# Patient Record
Sex: MALE | Race: ASIAN | NOT HISPANIC OR LATINO | Employment: OTHER
[De-identification: names, ages, dates, MRNs, and addresses within clinical notes are randomized per-mention and may not be internally consistent; named-entity substitution may affect disease eponyms.]

---

## 2017-07-21 ENCOUNTER — IMAGING SERVICES (OUTPATIENT)
Dept: OTHER | Age: 67
End: 2017-07-21

## 2017-07-21 ENCOUNTER — CHARTING TRANS (OUTPATIENT)
Dept: ORTHOPEDICS | Age: 67
End: 2017-07-21

## 2017-07-21 ENCOUNTER — CHARTING TRANS (OUTPATIENT)
Dept: OTHER | Age: 67
End: 2017-07-21

## 2018-07-26 ENCOUNTER — HOSPITAL (OUTPATIENT)
Dept: OTHER | Age: 68
End: 2018-07-26
Attending: INTERNAL MEDICINE

## 2018-07-26 ENCOUNTER — CHARTING TRANS (OUTPATIENT)
Dept: OTHER | Age: 68
End: 2018-07-26

## 2018-11-28 VITALS — HEIGHT: 69 IN | BODY MASS INDEX: 35.55 KG/M2 | WEIGHT: 240 LBS

## 2019-01-03 ENCOUNTER — HOSPITAL ENCOUNTER (OUTPATIENT)
Dept: ULTRASOUND IMAGING | Facility: HOSPITAL | Age: 69
Discharge: HOME | End: 2019-01-03
Attending: SPECIALIST
Payer: MEDICARE

## 2019-01-03 DIAGNOSIS — R60.0 LOWER EXTREMITY EDEMA: ICD-10-CM

## 2019-01-03 PROCEDURE — 93970 EXTREMITY STUDY: CPT | Performed by: SPECIALIST

## 2019-01-07 ENCOUNTER — HOSPITAL ENCOUNTER (OUTPATIENT)
Dept: CV DIAGNOSTICS | Facility: HOSPITAL | Age: 69
Discharge: HOME | End: 2019-01-07
Attending: SPECIALIST
Payer: MEDICARE

## 2019-01-07 DIAGNOSIS — R06.00 DYSPNEA, UNSPECIFIED: ICD-10-CM

## 2019-01-07 DIAGNOSIS — R55 SYNCOPE: ICD-10-CM

## 2019-01-07 DIAGNOSIS — E78.5 DYSLIPIDEMIA: ICD-10-CM

## 2019-01-07 PROCEDURE — 93018 CV STRESS TEST I&R ONLY: CPT | Performed by: SPECIALIST

## 2019-01-07 PROCEDURE — 93306 TTE W/DOPPLER COMPLETE: CPT | Performed by: SPECIALIST

## 2019-01-07 PROCEDURE — 78452 HT MUSCLE IMAGE SPECT MULT: CPT | Performed by: SPECIALIST

## 2019-01-07 PROCEDURE — 93017 CV STRESS TEST TRACING ONLY: CPT | Performed by: SPECIALIST

## 2019-01-09 ENCOUNTER — HOSPITAL ENCOUNTER (OUTPATIENT)
Dept: ULTRASOUND IMAGING | Facility: HOSPITAL | Age: 69
Discharge: HOME | End: 2019-01-09
Attending: SPECIALIST
Payer: MEDICARE

## 2019-01-09 ENCOUNTER — HOSPITAL ENCOUNTER (OUTPATIENT)
Dept: CV DIAGNOSTICS | Facility: HOSPITAL | Age: 69
Discharge: HOME | End: 2019-01-09
Attending: SPECIALIST
Payer: MEDICARE

## 2019-01-09 DIAGNOSIS — R06.00 DYSPNEA, UNSPECIFIED: ICD-10-CM

## 2019-01-09 DIAGNOSIS — R55 SYNCOPE, UNSPECIFIED SYNCOPE TYPE: ICD-10-CM

## 2019-01-09 DIAGNOSIS — R55 SYNCOPE: ICD-10-CM

## 2019-01-09 PROCEDURE — 93225 XTRNL ECG REC<48 HRS REC: CPT | Performed by: SPECIALIST

## 2019-01-09 PROCEDURE — 93226 XTRNL ECG REC<48 HR SCAN A/R: CPT | Performed by: SPECIALIST

## 2019-01-09 PROCEDURE — 93880 EXTRACRANIAL BILAT STUDY: CPT | Performed by: SPECIALIST

## 2019-01-09 PROCEDURE — 93227 XTRNL ECG REC<48 HR R&I: CPT | Performed by: SPECIALIST

## 2019-05-14 ENCOUNTER — APPOINTMENT (OUTPATIENT)
Dept: CT IMAGING | Facility: HOSPITAL | Age: 69
End: 2019-05-14
Attending: EMERGENCY MEDICINE
Payer: MEDICARE

## 2019-05-14 ENCOUNTER — HOSPITAL ENCOUNTER (EMERGENCY)
Facility: HOSPITAL | Age: 69
Discharge: HOME | End: 2019-05-14
Attending: EMERGENCY MEDICINE
Payer: MEDICARE

## 2019-05-14 VITALS
WEIGHT: 235 LBS | HEIGHT: 68 IN | TEMPERATURE: 98 F | RESPIRATION RATE: 21 BRPM | HEART RATE: 58 BPM | BODY MASS INDEX: 35.61 KG/M2 | DIASTOLIC BLOOD PRESSURE: 80 MMHG | OXYGEN SATURATION: 97 % | SYSTOLIC BLOOD PRESSURE: 127 MMHG

## 2019-05-14 DIAGNOSIS — R55 SYNCOPE, NEAR: Primary | ICD-10-CM

## 2019-05-14 PROCEDURE — 99285 EMERGENCY DEPT VISIT HI MDM: CPT | Performed by: EMERGENCY MEDICINE

## 2019-05-14 PROCEDURE — 80053 COMPREHEN METABOLIC PANEL: CPT | Performed by: EMERGENCY MEDICINE

## 2019-05-14 PROCEDURE — 96361 HYDRATE IV INFUSION ADD-ON: CPT | Performed by: EMERGENCY MEDICINE

## 2019-05-14 PROCEDURE — 96360 HYDRATION IV INFUSION INIT: CPT | Performed by: EMERGENCY MEDICINE

## 2019-05-14 PROCEDURE — 93010 ELECTROCARDIOGRAM REPORT: CPT | Performed by: EMERGENCY MEDICINE

## 2019-05-14 PROCEDURE — 85025 COMPLETE CBC W/AUTO DIFF WBC: CPT | Performed by: EMERGENCY MEDICINE

## 2019-05-14 PROCEDURE — 70450 CT HEAD/BRAIN W/O DYE: CPT | Performed by: EMERGENCY MEDICINE

## 2019-05-14 PROCEDURE — 93005 ELECTROCARDIOGRAM TRACING: CPT

## 2019-05-14 PROCEDURE — 84484 ASSAY OF TROPONIN QUANT: CPT | Performed by: EMERGENCY MEDICINE

## 2019-05-14 RX ORDER — SODIUM CHLORIDE 9 MG/ML
1000 INJECTION, SOLUTION INTRAVENOUS ONCE
Status: COMPLETED | OUTPATIENT
Start: 2019-05-14 | End: 2019-05-14

## 2019-05-14 NOTE — ED INITIAL ASSESSMENT (HPI)
Reports he has had dizziness over the last 3 days. Denies chest pain or shortness of breath. Denies h/a.

## 2019-05-14 NOTE — ED PROVIDER NOTES
Patient Seen in: [facility] Emergency Department    History   Patient presents with:  Dizziness (neurologic)    Stated Complaint: dizziness     HPI    The patient is a 69-year-old male with a history of BPH, hypertension, presenting to the emergency above.    Physical Exam     ED Triage Vitals [05/14/19 1542]   /79   Pulse 83   Resp 18   Temp 98.3 °F (36.8 °C)   Temp src Oral   SpO2 96 %   O2 Device None (Room air)       Current:/80   Pulse 55   Temp 98.3 °F (36.8 °C) (Oral)   Resp 16   Ht WITH DIFFERENTIAL WITH PLATELET.   Procedure                               Abnormality         Status                     ---------                               -----------         ------                     CBC W/ DIFFERENTIAL[[id number]]          Abnormal age-indeterminate     microvascular ischemic changes in the cerebral white matter. Right     tentorial calcification. There is no evident fracture. Small right maxillary retention cyst.      Minimal ethmoid mucosal thickening.   Fluid in the left [phone]    Call in 1 day          Medications Prescribed:  Current Discharge Medication List

## 2019-05-15 NOTE — ED NOTES
Report received from Ramsey Johnston, [facility]. Pt awaiting disposition. Family at bedside.

## 2019-05-18 ENCOUNTER — LAB ENCOUNTER (OUTPATIENT)
Dept: LAB | Facility: HOSPITAL | Age: 69
End: 2019-05-18
Attending: SPECIALIST
Payer: MEDICARE

## 2019-05-18 DIAGNOSIS — R55 SYNCOPE: ICD-10-CM

## 2019-05-18 DIAGNOSIS — R06.00 DYSPNEA, PAROXYSMAL NOCTURNAL: ICD-10-CM

## 2019-05-18 DIAGNOSIS — R42 DIZZINESS: Primary | ICD-10-CM

## 2019-05-18 PROCEDURE — 36415 COLL VENOUS BLD VENIPUNCTURE: CPT

## 2019-05-18 PROCEDURE — 85025 COMPLETE CBC W/AUTO DIFF WBC: CPT

## 2019-05-18 PROCEDURE — 80048 BASIC METABOLIC PNL TOTAL CA: CPT

## 2019-05-18 PROCEDURE — 83735 ASSAY OF MAGNESIUM: CPT

## 2019-05-18 PROCEDURE — 85378 FIBRIN DEGRADE SEMIQUANT: CPT

## 2019-06-10 ENCOUNTER — HOSPITAL ENCOUNTER (OUTPATIENT)
Dept: CV DIAGNOSTICS | Facility: HOSPITAL | Age: 69
Discharge: HOME | End: 2019-06-10
Attending: SPECIALIST
Payer: MEDICARE

## 2019-06-10 DIAGNOSIS — R06.00 DYSPNEA, UNSPECIFIED TYPE: ICD-10-CM

## 2019-06-10 DIAGNOSIS — R42 DIZZINESS: ICD-10-CM

## 2019-06-10 PROCEDURE — 93227 XTRNL ECG REC<48 HR R&I: CPT | Performed by: SPECIALIST

## 2019-06-10 PROCEDURE — 93226 XTRNL ECG REC<48 HR SCAN A/R: CPT | Performed by: SPECIALIST

## 2019-06-10 PROCEDURE — 93225 XTRNL ECG REC<48 HRS REC: CPT | Performed by: SPECIALIST

## 2019-06-11 ENCOUNTER — TELEPHONE (OUTPATIENT)
Dept: CARDIOLOGY | Age: 69
End: 2019-06-11

## 2019-07-02 ENCOUNTER — TELEPHONE (OUTPATIENT)
Dept: CARDIOLOGY | Age: 69
End: 2019-07-02

## 2019-07-02 ENCOUNTER — OFFICE VISIT (OUTPATIENT)
Dept: CARDIOLOGY | Age: 69
End: 2019-07-02

## 2019-07-02 VITALS
BODY MASS INDEX: 37.98 KG/M2 | WEIGHT: 242 LBS | SYSTOLIC BLOOD PRESSURE: 142 MMHG | HEIGHT: 67 IN | DIASTOLIC BLOOD PRESSURE: 82 MMHG | HEART RATE: 72 BPM

## 2019-07-02 DIAGNOSIS — R07.2 PRECORDIAL PAIN: ICD-10-CM

## 2019-07-02 DIAGNOSIS — R42 DIZZINESS: Primary | ICD-10-CM

## 2019-07-02 DIAGNOSIS — I10 ESSENTIAL HYPERTENSION: ICD-10-CM

## 2019-07-02 PROBLEM — R07.9 CHEST PAIN: Status: ACTIVE | Noted: 2019-07-02

## 2019-07-02 PROCEDURE — 99205 OFFICE O/P NEW HI 60 MIN: CPT | Performed by: INTERNAL MEDICINE

## 2019-07-02 RX ORDER — LOSARTAN POTASSIUM 50 MG/1
50 TABLET ORAL 2 TIMES DAILY
COMMUNITY

## 2019-07-02 RX ORDER — FINASTERIDE 5 MG/1
5 TABLET, FILM COATED ORAL DAILY
COMMUNITY

## 2019-07-02 RX ORDER — ATORVASTATIN CALCIUM 20 MG/1
20 TABLET, FILM COATED ORAL DAILY
COMMUNITY

## 2019-07-02 RX ORDER — METOPROLOL SUCCINATE 25 MG/1
12.5 TABLET, EXTENDED RELEASE ORAL
COMMUNITY
End: 2019-12-10 | Stop reason: ALTCHOICE

## 2019-07-02 RX ORDER — DIAZEPAM 10 MG/1
TABLET ORAL
Refills: 0 | COMMUNITY
Start: 2019-06-05 | End: 2019-07-02 | Stop reason: ALTCHOICE

## 2019-07-02 RX ORDER — LANOLIN ALCOHOL/MO/W.PET/CERES
1000 CREAM (GRAM) TOPICAL DAILY
COMMUNITY

## 2019-08-23 ENCOUNTER — HOSPITAL ENCOUNTER (OUTPATIENT)
Dept: MRI IMAGING | Facility: HOSPITAL | Age: 69
Discharge: HOME | End: 2019-08-23
Attending: SPECIALIST
Payer: MEDICARE

## 2019-08-23 DIAGNOSIS — R42 DIZZINESS: ICD-10-CM

## 2019-08-23 DIAGNOSIS — R06.00 DYSPNEA: ICD-10-CM

## 2019-09-17 ENCOUNTER — HOSPITAL ENCOUNTER (OUTPATIENT)
Dept: MRI IMAGING | Facility: HOSPITAL | Age: 69
Discharge: HOME | End: 2019-09-17
Attending: SPECIALIST
Payer: MEDICARE

## 2019-09-17 DIAGNOSIS — R42 DIZZINESS: ICD-10-CM

## 2019-09-17 DIAGNOSIS — R55 SYNCOPE: ICD-10-CM

## 2019-09-17 PROCEDURE — 70547 MR ANGIOGRAPHY NECK W/O DYE: CPT | Performed by: SPECIALIST

## 2019-09-17 PROCEDURE — 70544 MR ANGIOGRAPHY HEAD W/O DYE: CPT | Performed by: SPECIALIST

## 2019-09-19 ENCOUNTER — HOSPITAL ENCOUNTER (OUTPATIENT)
Dept: MRI IMAGING | Facility: HOSPITAL | Age: 69
Discharge: HOME | End: 2019-09-19
Attending: SPECIALIST
Payer: MEDICARE

## 2019-09-19 DIAGNOSIS — R42 DIZZINESS: ICD-10-CM

## 2019-09-19 DIAGNOSIS — R55 SYNCOPE: ICD-10-CM

## 2019-09-19 PROCEDURE — A9575 INJ GADOTERATE MEGLUMI 0.1ML: HCPCS | Performed by: SPECIALIST

## 2019-09-19 PROCEDURE — 82565 ASSAY OF CREATININE: CPT

## 2019-09-19 PROCEDURE — 70553 MRI BRAIN STEM W/O & W/DYE: CPT | Performed by: SPECIALIST

## 2019-09-20 LAB — CREAT BLD-MCNC: 0.9 MG/DL (ref 0.7–1.3)

## 2019-11-15 ENCOUNTER — TELEPHONE (OUTPATIENT)
Dept: CARDIOLOGY | Age: 69
End: 2019-11-15

## 2019-12-05 ENCOUNTER — HOSPITAL ENCOUNTER (OUTPATIENT)
Dept: CV DIAGNOSTICS | Facility: HOSPITAL | Age: 69
Discharge: HOME | End: 2019-12-05
Attending: INTERNAL MEDICINE
Payer: MEDICARE

## 2019-12-05 DIAGNOSIS — I10 ESSENTIAL HYPERTENSION: ICD-10-CM

## 2019-12-05 DIAGNOSIS — R07.2 PRECORDIAL PAIN: ICD-10-CM

## 2019-12-05 DIAGNOSIS — R42 DIZZINESS: ICD-10-CM

## 2019-12-05 PROCEDURE — 93350 STRESS TTE ONLY: CPT | Performed by: INTERNAL MEDICINE

## 2019-12-05 PROCEDURE — 93018 CV STRESS TEST I&R ONLY: CPT | Performed by: INTERNAL MEDICINE

## 2019-12-05 PROCEDURE — 93017 CV STRESS TEST TRACING ONLY: CPT | Performed by: INTERNAL MEDICINE

## 2019-12-09 ENCOUNTER — TELEPHONE (OUTPATIENT)
Dept: CARDIOLOGY | Age: 69
End: 2019-12-09

## 2019-12-10 ENCOUNTER — OFFICE VISIT (OUTPATIENT)
Dept: CARDIOLOGY | Age: 69
End: 2019-12-10

## 2019-12-10 VITALS
WEIGHT: 242 LBS | HEIGHT: 66 IN | SYSTOLIC BLOOD PRESSURE: 100 MMHG | HEART RATE: 64 BPM | DIASTOLIC BLOOD PRESSURE: 60 MMHG | BODY MASS INDEX: 38.89 KG/M2

## 2019-12-10 DIAGNOSIS — R42 DIZZINESS: ICD-10-CM

## 2019-12-10 DIAGNOSIS — I10 ESSENTIAL HYPERTENSION: Primary | ICD-10-CM

## 2019-12-10 PROCEDURE — 99214 OFFICE O/P EST MOD 30 MIN: CPT | Performed by: INTERNAL MEDICINE

## 2019-12-10 RX ORDER — AMLODIPINE BESYLATE 2.5 MG/1
2.5 TABLET ORAL DAILY
Refills: 2 | COMMUNITY
Start: 2019-11-25

## 2019-12-10 ASSESSMENT — PATIENT HEALTH QUESTIONNAIRE - PHQ9
SUM OF ALL RESPONSES TO PHQ9 QUESTIONS 1 AND 2: 0
1. LITTLE INTEREST OR PLEASURE IN DOING THINGS: NOT AT ALL
2. FEELING DOWN, DEPRESSED OR HOPELESS: NOT AT ALL
SUM OF ALL RESPONSES TO PHQ9 QUESTIONS 1 AND 2: 0

## 2020-01-16 ENCOUNTER — TELEPHONE (OUTPATIENT)
Dept: CARDIOLOGY | Age: 70
End: 2020-01-16

## 2020-12-01 ENCOUNTER — OFFICE VISIT (OUTPATIENT)
Dept: CARDIOLOGY | Age: 70
End: 2020-12-01

## 2020-12-01 VITALS
DIASTOLIC BLOOD PRESSURE: 68 MMHG | HEIGHT: 66 IN | HEART RATE: 60 BPM | BODY MASS INDEX: 38.09 KG/M2 | WEIGHT: 237 LBS | SYSTOLIC BLOOD PRESSURE: 128 MMHG

## 2020-12-01 DIAGNOSIS — I10 ESSENTIAL HYPERTENSION: Primary | ICD-10-CM

## 2020-12-01 PROCEDURE — 99214 OFFICE O/P EST MOD 30 MIN: CPT | Performed by: INTERNAL MEDICINE

## 2020-12-01 SDOH — SOCIAL STABILITY: SOCIAL NETWORK: ARE YOU MARRIED, WIDOWED, DIVORCED, SEPARATED, NEVER MARRIED, OR LIVING WITH A PARTNER?: MARRIED

## 2020-12-01 SDOH — HEALTH STABILITY: PHYSICAL HEALTH: ON AVERAGE, HOW MANY DAYS PER WEEK DO YOU ENGAGE IN MODERATE TO STRENUOUS EXERCISE (LIKE A BRISK WALK)?: 3 DAYS

## 2020-12-01 SDOH — HEALTH STABILITY: PHYSICAL HEALTH: ON AVERAGE, HOW MANY MINUTES DO YOU ENGAGE IN EXERCISE AT THIS LEVEL?: 60 MIN

## 2020-12-01 ASSESSMENT — PATIENT HEALTH QUESTIONNAIRE - PHQ9
CLINICAL INTERPRETATION OF PHQ9 SCORE: NO FURTHER SCREENING NEEDED
SUM OF ALL RESPONSES TO PHQ9 QUESTIONS 1 AND 2: 0
2. FEELING DOWN, DEPRESSED OR HOPELESS: NOT AT ALL
1. LITTLE INTEREST OR PLEASURE IN DOING THINGS: NOT AT ALL
CLINICAL INTERPRETATION OF PHQ2 SCORE: NO FURTHER SCREENING NEEDED
SUM OF ALL RESPONSES TO PHQ9 QUESTIONS 1 AND 2: 0

## 2021-04-28 ENCOUNTER — ORDER TRANSCRIPTION (OUTPATIENT)
Dept: PHYSICAL THERAPY | Facility: HOSPITAL | Age: 71
End: 2021-04-28

## 2021-04-28 DIAGNOSIS — M54.31 BILATERAL SCIATICA: ICD-10-CM

## 2021-04-28 DIAGNOSIS — M54.32 BILATERAL SCIATICA: ICD-10-CM

## 2021-04-28 DIAGNOSIS — M54.31 SCIATICA OF RIGHT SIDE: ICD-10-CM

## 2021-04-28 DIAGNOSIS — M25.551 RIGHT HIP PAIN: Primary | ICD-10-CM

## 2021-04-28 DIAGNOSIS — R26.89 LIMP: ICD-10-CM

## 2021-05-21 ENCOUNTER — OFFICE VISIT (OUTPATIENT)
Dept: PHYSICAL THERAPY | Facility: HOSPITAL | Age: 71
End: 2021-05-21
Attending: PEDIATRICS
Payer: MEDICARE

## 2021-05-21 ENCOUNTER — EXTERNAL RECORD (OUTPATIENT)
Dept: HEALTH INFORMATION MANAGEMENT | Facility: OTHER | Age: 71
End: 2021-05-21

## 2021-05-21 DIAGNOSIS — M25.551 RIGHT HIP PAIN: ICD-10-CM

## 2021-05-21 DIAGNOSIS — M54.32 BILATERAL SCIATICA: ICD-10-CM

## 2021-05-21 DIAGNOSIS — M54.31 BILATERAL SCIATICA: ICD-10-CM

## 2021-05-21 PROCEDURE — 97110 THERAPEUTIC EXERCISES: CPT | Performed by: PHYSICAL THERAPIST

## 2021-05-21 PROCEDURE — 97162 PT EVAL MOD COMPLEX 30 MIN: CPT | Performed by: PHYSICAL THERAPIST

## 2021-05-21 NOTE — PROGRESS NOTES
SPINE EVALUATION:   Referring Physician: Dr. Kelly Chaney  Diagnosis: Right hip pain (M25.551)  Bilateral sciatica (M54.31,M54.32)          Date of Service: 5/21/2021     PATIENT SUMMARY   Tejal George is a [id number] old male who presents to therapy today with compla at night . Delon Hebert The results of the objective tests and measures show loss of ROM of lumbar spine, poor mechanics, atrophy of the right buttock and reproducible pain on the right piriformis.   Functional deficits include but are not limited to limited with sitti R 5/5; L 5/5   DF (L4): R 5/5; L 5/5  Great Toe Ext (L5): R 5/5, L 5/5  PF (S1): R 5/5; L 5/5     Flexibility:   UE/Scapular LE    Hamstrings: R 35; L 46       Special tests:   +dural tension right Le  + SLR right LE       Gait: pt ambulates on level groun will include: Gait training, Manual Therapy, Neuromuscular Re-education, Self-Care Home Management, Therapeutic Activities, Therapeutic Exercise and Home Exercise Program instruction    Education or treatment limitation: None  Rehab Potential:good    FOTO:

## 2021-05-24 ENCOUNTER — OFFICE VISIT (OUTPATIENT)
Dept: PHYSICAL THERAPY | Facility: HOSPITAL | Age: 71
End: 2021-05-24
Attending: PEDIATRICS
Payer: MEDICARE

## 2021-05-24 DIAGNOSIS — M25.551 RIGHT HIP PAIN: ICD-10-CM

## 2021-05-24 DIAGNOSIS — M54.32 BILATERAL SCIATICA: ICD-10-CM

## 2021-05-24 DIAGNOSIS — M54.31 BILATERAL SCIATICA: ICD-10-CM

## 2021-05-24 PROCEDURE — 97110 THERAPEUTIC EXERCISES: CPT | Performed by: PHYSICAL THERAPIST

## 2021-05-24 PROCEDURE — 97140 MANUAL THERAPY 1/> REGIONS: CPT | Performed by: PHYSICAL THERAPIST

## 2021-05-24 NOTE — PROGRESS NOTES
Dx: Right hip pain (M25.551)  Bilateral sciatica (M54.31,M54.32)         Insurance (Authorized # of Visits):  6           Authorizing Physician: Dr. Nadege Lopez  Next MD visit: none scheduled  Fall Risk: standard         Precautions: n/a             Subjective:S 40 min  Total Treatment Time: 45 min

## 2021-05-28 ENCOUNTER — OFFICE VISIT (OUTPATIENT)
Dept: PHYSICAL THERAPY | Facility: HOSPITAL | Age: 71
End: 2021-05-28
Attending: PEDIATRICS
Payer: MEDICARE

## 2021-05-28 DIAGNOSIS — M54.32 BILATERAL SCIATICA: ICD-10-CM

## 2021-05-28 DIAGNOSIS — M25.551 RIGHT HIP PAIN: ICD-10-CM

## 2021-05-28 DIAGNOSIS — M54.31 BILATERAL SCIATICA: ICD-10-CM

## 2021-05-28 PROCEDURE — 97110 THERAPEUTIC EXERCISES: CPT | Performed by: PHYSICAL THERAPIST

## 2021-05-28 PROCEDURE — 97140 MANUAL THERAPY 1/> REGIONS: CPT | Performed by: PHYSICAL THERAPIST

## 2021-05-28 NOTE — PROGRESS NOTES
Dx: Right hip pain (M25.551)  Bilateral sciatica (M54.31,M54.32)         Insurance (Authorized # of Visits):  6           Authorizing Physician: Dr. Jasmine Bailey  Next MD visit: none scheduled  Fall Risk: standard         Precautions: n/a             Subjective:P in piriformis mm anatomy, sciatic nerve distribution   instruction seated  piriformis  stretch       STM side lying to right hip/ITB/piriformis mm   clams x20 no resistance  STM side lying to right hip/ITB/piriformis mm   clams x20 no resistance      HEP:

## 2021-06-01 ENCOUNTER — APPOINTMENT (OUTPATIENT)
Dept: PHYSICAL THERAPY | Facility: HOSPITAL | Age: 71
End: 2021-06-01
Attending: PEDIATRICS
Payer: MEDICARE

## 2021-06-04 ENCOUNTER — OFFICE VISIT (OUTPATIENT)
Dept: PHYSICAL THERAPY | Facility: HOSPITAL | Age: 71
End: 2021-06-04
Attending: PEDIATRICS
Payer: MEDICARE

## 2021-06-04 DIAGNOSIS — M25.551 RIGHT HIP PAIN: ICD-10-CM

## 2021-06-04 DIAGNOSIS — M54.31 BILATERAL SCIATICA: ICD-10-CM

## 2021-06-04 DIAGNOSIS — M54.32 BILATERAL SCIATICA: ICD-10-CM

## 2021-06-04 PROCEDURE — 97140 MANUAL THERAPY 1/> REGIONS: CPT | Performed by: PHYSICAL THERAPIST

## 2021-06-04 PROCEDURE — 97110 THERAPEUTIC EXERCISES: CPT | Performed by: PHYSICAL THERAPIST

## 2021-06-04 NOTE — PROGRESS NOTES
Dx: Right hip pain (M25.551)  Bilateral sciatica (M54.31,M54.32)         Insurance (Authorized # of Visits):  6           Authorizing Physician: Dr. Doc Chamorro  Next MD visit: none scheduled  Fall Risk: standard         Precautions: n/a             Subjective:S ball stm to right piriformis mm   Seated Piriformis stetch   RADAMES seated stretch      Stretching:    Piriformis x3 @ 15 sec with grn stretch out strap     instruction in piriformis mm anatomy, sciatic nerve distribution   instruction seated  piriformis  s

## 2021-06-08 ENCOUNTER — APPOINTMENT (OUTPATIENT)
Dept: PHYSICAL THERAPY | Facility: HOSPITAL | Age: 71
End: 2021-06-08
Attending: PEDIATRICS
Payer: MEDICARE

## 2021-06-11 ENCOUNTER — OFFICE VISIT (OUTPATIENT)
Dept: PHYSICAL THERAPY | Facility: HOSPITAL | Age: 71
End: 2021-06-11
Attending: PEDIATRICS
Payer: MEDICARE

## 2021-06-11 DIAGNOSIS — M54.31 BILATERAL SCIATICA: ICD-10-CM

## 2021-06-11 DIAGNOSIS — M54.32 BILATERAL SCIATICA: ICD-10-CM

## 2021-06-11 DIAGNOSIS — M25.551 RIGHT HIP PAIN: ICD-10-CM

## 2021-06-11 PROCEDURE — 97140 MANUAL THERAPY 1/> REGIONS: CPT | Performed by: PHYSICAL THERAPIST

## 2021-06-11 PROCEDURE — 97110 THERAPEUTIC EXERCISES: CPT | Performed by: PHYSICAL THERAPIST

## 2021-06-14 NOTE — PROGRESS NOTES
Dx: Right hip pain (M25.551)  Bilateral sciatica (M54.31,M54.32)         Insurance (Authorized # of Visits):  6           Authorizing Physician: Dr. Bibi Henson  Next MD visit: none scheduled  Fall Risk: standard         Precautions: n/a             Subjective:D Piriformis stretch   RADAMES seated stretch  TE:   SB x20:   Bridge  LBR   DKTC   Piriformis stretch   HEP:   Tennis ball STM to right piriformis mm (review)   Prone hip flex stretch B     Stretching:    Piriformis x3 @ 15 sec with grn stretch out strap

## 2021-06-15 ENCOUNTER — OFFICE VISIT (OUTPATIENT)
Dept: PHYSICAL THERAPY | Facility: HOSPITAL | Age: 71
End: 2021-06-15
Attending: PEDIATRICS
Payer: MEDICARE

## 2021-06-15 DIAGNOSIS — M54.31 BILATERAL SCIATICA: ICD-10-CM

## 2021-06-15 DIAGNOSIS — M54.32 BILATERAL SCIATICA: ICD-10-CM

## 2021-06-15 DIAGNOSIS — M25.551 RIGHT HIP PAIN: ICD-10-CM

## 2021-06-15 PROCEDURE — 97110 THERAPEUTIC EXERCISES: CPT | Performed by: PHYSICAL THERAPIST

## 2021-06-15 PROCEDURE — 97140 MANUAL THERAPY 1/> REGIONS: CPT | Performed by: PHYSICAL THERAPIST

## 2021-06-15 NOTE — PROGRESS NOTES
Dx: Right hip pain (M25.551)  Bilateral sciatica (M54.31,M54.32)         Insurance (Authorized # of Visits):  6           Authorizing Physician: Dr. Nadege Lopez  Next MD visit: none scheduled  Fall Risk: standard         Precautions: n/a             Subjective:I seated stretch  TE:   SB x20:   Bridge  LBR   DKTC   Piriformis stretch   HEP:   Tennis ball STM to right piriformis mm (review)   Prone hip flex stretch B  TE:   SB x20:   Bridge  LBR   DKTC   Piriformis stretch   HEP:   Tennis ball STM to right piriformi

## 2021-06-25 ENCOUNTER — OFFICE VISIT (OUTPATIENT)
Dept: PHYSICAL THERAPY | Facility: HOSPITAL | Age: 71
End: 2021-06-25
Attending: PEDIATRICS
Payer: MEDICARE

## 2021-06-25 PROCEDURE — 97110 THERAPEUTIC EXERCISES: CPT | Performed by: PHYSICAL THERAPIST

## 2021-06-25 PROCEDURE — 97140 MANUAL THERAPY 1/> REGIONS: CPT | Performed by: PHYSICAL THERAPIST

## 2021-06-25 NOTE — PROGRESS NOTES
Dx: Right hip pain (M25.551)  Bilateral sciatica (M54.31,M54.32)         Insurance (Authorized # of Visits):  6           Authorizing Physician: Dr. Charissa Kurtz  Next MD visit: none scheduled  Fall Risk: standard         Precautions: n/a             Subjective: Date:            5/28/21     TX#: 3/8 Date:            6/4/21     TX#: 4/8 Date:            6/10/21     TX#: 5/ Date: 6/15/21  Tx#: 6/8 Date: 6/25/21  Tx#: 7/8   NuStep x6' reds 4 seat 8  NuStep x6' reds 4 seat 8 NuStep x6' reds 4 seat 8 NuStep x6' reds 4 resistance HS stretch, passive flossing bilaterally   X3 each   Hip abd stretch x3 @ 30 sec   Manual leg pulls x20  HS stretch, passive flossing bilaterally   X3 each   Hip abd stretch x3 @ 30 sec   Manual leg pulls x20 Red TB IR/er prone x10x2  Assess lum

## 2021-07-07 ENCOUNTER — TELEPHONE (OUTPATIENT)
Dept: PHYSICAL THERAPY | Facility: HOSPITAL | Age: 71
End: 2021-07-07

## 2021-07-09 ENCOUNTER — APPOINTMENT (OUTPATIENT)
Dept: PHYSICAL THERAPY | Facility: HOSPITAL | Age: 71
End: 2021-07-09
Attending: PEDIATRICS
Payer: MEDICARE

## 2021-07-16 ENCOUNTER — OFFICE VISIT (OUTPATIENT)
Dept: PHYSICAL THERAPY | Facility: HOSPITAL | Age: 71
End: 2021-07-16
Attending: PEDIATRICS
Payer: MEDICARE

## 2021-07-16 PROCEDURE — 97140 MANUAL THERAPY 1/> REGIONS: CPT | Performed by: PHYSICAL THERAPIST

## 2021-07-16 PROCEDURE — 97110 THERAPEUTIC EXERCISES: CPT | Performed by: PHYSICAL THERAPIST

## 2021-07-16 NOTE — PROGRESS NOTES
Dx: Right hip pain (M25.551)  Bilateral sciatica (M54.31,M54.32)         Insurance (Authorized # of Visits):  6           Authorizing Physician: Dr. Chito Blas MD visit: none scheduled  Fall Risk: standard         Precautions: n/a             Subjective: HEP:   Tennis ball STM to right piriformis mm   Seated Piriformis stretch   RADAMES seated stretch  TE:   SB x20:   Bridge  LBR   DKTC   Piriformis stretch   HEP:   Tennis ball STM to right piriformis mm (review)   Prone hip flex stretch B  TE:   SB x20: unilateral PA\"s ,  PA's gr 2 , unilateral PA\"s ,    HEP: see patient instructions   Modified piriformis mm stretch to only stretch with hip in ER     Charges: 2 TE 1 manual        Total Timed Treatment: 40 min  Total Treatment Time: 45 min

## 2021-07-20 ENCOUNTER — TELEPHONE (OUTPATIENT)
Dept: PHYSICAL THERAPY | Facility: HOSPITAL | Age: 71
End: 2021-07-20

## 2021-07-23 ENCOUNTER — APPOINTMENT (OUTPATIENT)
Dept: PHYSICAL THERAPY | Facility: HOSPITAL | Age: 71
End: 2021-07-23
Attending: PEDIATRICS
Payer: MEDICARE

## 2021-12-01 ENCOUNTER — TELEPHONE (OUTPATIENT)
Dept: CARDIOLOGY | Age: 71
End: 2021-12-01

## 2021-12-07 ENCOUNTER — APPOINTMENT (OUTPATIENT)
Dept: CARDIOLOGY | Age: 71
End: 2021-12-07

## 2022-09-28 ENCOUNTER — OFFICE VISIT (OUTPATIENT)
Facility: LOCATION | Age: 72
End: 2022-09-28

## 2022-09-28 DIAGNOSIS — H90.3 SENSORINEURAL HEARING LOSS (SNHL) OF BOTH EARS: Primary | ICD-10-CM

## 2022-09-28 DIAGNOSIS — H61.23 BILATERAL IMPACTED CERUMEN: ICD-10-CM

## 2022-09-28 DIAGNOSIS — H93.293 ABNORMAL AUDITORY PERCEPTION OF BOTH EARS: Primary | ICD-10-CM

## 2022-09-28 PROCEDURE — 92504 EAR MICROSCOPY EXAMINATION: CPT | Performed by: OTOLARYNGOLOGY

## 2022-09-28 PROCEDURE — 92557 COMPREHENSIVE HEARING TEST: CPT | Performed by: AUDIOLOGIST

## 2022-09-28 PROCEDURE — 99214 OFFICE O/P EST MOD 30 MIN: CPT | Performed by: OTOLARYNGOLOGY

## 2022-09-28 NOTE — PROGRESS NOTES
Maribel Pollard was seen for audiometric evaluation today. Referred back to physician.      Audree Cheadle, Au.D

## 2023-01-12 ENCOUNTER — TELEPHONE (OUTPATIENT)
Dept: SPORTS MEDICINE | Age: 73
End: 2023-01-12

## 2023-01-12 DIAGNOSIS — M54.12 CERVICAL RADICULOPATHY: Primary | ICD-10-CM

## 2023-01-12 DIAGNOSIS — M54.2 NECK PAIN: ICD-10-CM

## 2023-01-15 ASSESSMENT — ENCOUNTER SYMPTOMS
QUALITY: DISCOMFORT
PAIN FREQUENCY: INTERMITTENT
QUALITY: ACHE

## 2023-01-18 ENCOUNTER — HOSPITAL ENCOUNTER (OUTPATIENT)
Dept: PHYSICAL MEDICINE AND REHAB | Age: 73
Discharge: STILL ADMITTED OR ADMINISTRATIVE | End: 2023-01-18
Attending: PEDIATRICS

## 2023-01-18 PROCEDURE — 97161 PT EVAL LOW COMPLEX 20 MIN: CPT | Performed by: PHYSICAL THERAPIST

## 2023-01-18 ASSESSMENT — ENCOUNTER SYMPTOMS
PAIN LOCATION: LEFT SHOULDER AND ARM PAIN
SUBJECTIVE PAIN PROGRESSION: NO CHANGE
ALLEVIATING FACTORS: OVER-THE-COUNTER MEDICATION
ALLEVIATING FACTORS: CHANGE IN POSITION
PAIN SEVERITY NOW: 5

## 2023-01-20 ENCOUNTER — HOSPITAL ENCOUNTER (OUTPATIENT)
Dept: PHYSICAL MEDICINE AND REHAB | Age: 73
Discharge: STILL ADMITTED OR ADMINISTRATIVE | End: 2023-01-20

## 2023-01-20 PROCEDURE — 97530 THERAPEUTIC ACTIVITIES: CPT

## 2023-01-20 ASSESSMENT — ENCOUNTER SYMPTOMS: PAIN SEVERITY NOW: 4

## 2023-01-24 ENCOUNTER — HOSPITAL ENCOUNTER (OUTPATIENT)
Dept: PHYSICAL MEDICINE AND REHAB | Age: 73
Discharge: STILL ADMITTED OR ADMINISTRATIVE | End: 2023-01-24

## 2023-01-24 PROCEDURE — 97110 THERAPEUTIC EXERCISES: CPT | Performed by: PHYSICAL THERAPIST

## 2023-01-24 PROCEDURE — 97112 NEUROMUSCULAR REEDUCATION: CPT | Performed by: PHYSICAL THERAPIST

## 2023-01-27 ENCOUNTER — HOSPITAL ENCOUNTER (OUTPATIENT)
Dept: PHYSICAL MEDICINE AND REHAB | Age: 73
Discharge: STILL ADMITTED OR ADMINISTRATIVE | End: 2023-01-27

## 2023-01-27 PROCEDURE — 97140 MANUAL THERAPY 1/> REGIONS: CPT

## 2023-01-27 PROCEDURE — 97530 THERAPEUTIC ACTIVITIES: CPT

## 2023-02-01 ENCOUNTER — HOSPITAL ENCOUNTER (OUTPATIENT)
Dept: PHYSICAL MEDICINE AND REHAB | Age: 73
Discharge: STILL ADMITTED OR ADMINISTRATIVE | End: 2023-02-01

## 2023-02-01 PROCEDURE — 97140 MANUAL THERAPY 1/> REGIONS: CPT | Performed by: PHYSICAL THERAPIST

## 2023-02-01 PROCEDURE — 97112 NEUROMUSCULAR REEDUCATION: CPT | Performed by: PHYSICAL THERAPIST

## 2023-02-01 PROCEDURE — 97110 THERAPEUTIC EXERCISES: CPT | Performed by: PHYSICAL THERAPIST

## 2023-02-01 ASSESSMENT — ENCOUNTER SYMPTOMS: PAIN SEVERITY NOW: 4

## 2023-02-03 ENCOUNTER — APPOINTMENT (OUTPATIENT)
Dept: PHYSICAL MEDICINE AND REHAB | Age: 73
End: 2023-02-03

## 2023-02-08 ENCOUNTER — APPOINTMENT (OUTPATIENT)
Dept: PHYSICAL MEDICINE AND REHAB | Age: 73
End: 2023-02-08

## 2023-02-10 ENCOUNTER — APPOINTMENT (OUTPATIENT)
Dept: PHYSICAL MEDICINE AND REHAB | Age: 73
End: 2023-02-10

## 2024-01-03 ENCOUNTER — TELEPHONE (OUTPATIENT)
Dept: SPORTS MEDICINE | Age: 74
End: 2024-01-03

## 2024-01-03 DIAGNOSIS — G89.29 CHRONIC LEFT SHOULDER PAIN: Primary | ICD-10-CM

## 2024-01-03 DIAGNOSIS — M25.512 CHRONIC LEFT SHOULDER PAIN: Primary | ICD-10-CM

## 2024-01-17 ENCOUNTER — OFFICE VISIT (OUTPATIENT)
Facility: LOCATION | Age: 74
End: 2024-01-17
Payer: MEDICARE

## 2024-01-17 DIAGNOSIS — H83.09 LABYRINTHITIS, UNSPECIFIED LATERALITY: Primary | ICD-10-CM

## 2024-01-17 DIAGNOSIS — H90.3 SENSORINEURAL HEARING LOSS (SNHL) OF BOTH EARS: ICD-10-CM

## 2024-01-17 PROCEDURE — 99214 OFFICE O/P EST MOD 30 MIN: CPT | Performed by: OTOLARYNGOLOGY

## 2024-01-17 RX ORDER — DIAZEPAM 5 MG/1
5 TABLET ORAL EVERY 6 HOURS PRN
Qty: 10 TABLET | Refills: 0 | Status: SHIPPED | OUTPATIENT
Start: 2024-01-17

## 2024-01-18 NOTE — PROGRESS NOTES
Killian Villasenor is a 73 year old male.   Chief Complaint   Patient presents with    Ear Problem     HPI:   73-year-old male we have seen for his hearing loss also has had longstanding dizziness was worked up [place] by Dr. Teran.  On December 1 he had a severe episode of dizziness which took some time to resolve itself.  He is here for evaluation he refused audiogram today  Current Outpatient Medications   Medication Sig Dispense Refill    amLODIPine 2.5 MG Oral Tab Take 1 tablet (2.5 mg total) by mouth daily.      PEG 3350-KCl-Na Bicarb-NaCl 420 g Oral Recon Soln Take as directed by physician 4000 mL 0    ATORVASTATIN CALCIUM OR Take by mouth.      finasteride (PROSCAR) 5 MG Oral Tab Take 1 tablet (5 mg total) by mouth daily.      Losartan Potassium (COZAAR) 25 MG Oral Tab Take 1 tablet (25 mg total) by mouth daily.      aspirin 81 MG Oral Tab Take 1 tablet (81 mg total) by mouth daily.      tamsulosin HCl (FLOMAX) 0.4 MG Oral Cap Take 2 capsules by mouth daily. 180 capsule 3      Past Medical History:   Diagnosis Date    BPH (benign prostatic hyperplasia)     Essential hypertension     Frequent urination     Sleep apnea       Social History:  Social History     Socioeconomic History    Marital status:    Tobacco Use    Smoking status: Never    Smokeless tobacco: Never   Substance and Sexual Activity    Alcohol use: Yes     Alcohol/week: 5.0 standard drinks of alcohol     Types: 2 Glasses of wine, 1 Cans of beer, 2 Standard drinks or equivalent per week     Comment: Socially    Drug use: No      Past Surgical History:   Procedure Laterality Date    COLONOSCOPY      KNEE REPLACEMENT SURGERY           REVIEW OF SYSTEMS:   GENERAL HEALTH: feels well otherwise  GENERAL : denies fever, chills, sweats, weight loss, weight gain  SKIN: denies any unusual skin lesions or rashes  RESPIRATORY: denies shortness of breath with exertion  NEURO: denies headaches    EXAM:   There were no vitals taken for this visit.    System  Pertinent findings Details   Constitutional  Overall appearance - Normal.   Head/Face  Facial features -- Normal. Skull - Normal.   Eyes  Pupils equal ,round ,react to light and accomidate   Ears  External Ear Right: Normal, Left: Normal. Canal - Right: Normal, Left: Normal. TM - Right: Normal left: Normal   Nose  External Nose, Normal, Septum -midline,Nasal Vault, clear. Turbinates - Right: Normal left: Normal   Mouth/Throat  Lips/teeth/gums - Normal. Tonsils -1+ oropharynx - Normal.   Neck Exam  Inspection - Normal. Palpation - Normal. Parotid gland - Normal. Thyroid gland -normal   Lymph Detail  Submental. Submandibular. Anterior cervical. Posterior cervical. Supraclavicular.       ASSESSMENT AND PLAN:   1. Labyrinthitis, unspecified laterality  Based on his history I think he had labyrinthitis in his come back to normal.  However he has had multiple episodes of vertigo for this reason we are going to order:  MRI through IACs with gadolinium  Valium 5 mg taken 1 hour before for claustrophobia    2. Sensorineural hearing loss (SNHL) of both ears  Continue monitoring      The patient indicates understanding of these issues and agrees to the plan.      Geoff Mcgowan MD  1/17/2024  6:07 PM

## 2024-02-05 ENCOUNTER — HOSPITAL ENCOUNTER (OUTPATIENT)
Dept: MRI IMAGING | Facility: HOSPITAL | Age: 74
Discharge: HOME | End: 2024-02-05
Attending: OTOLARYNGOLOGY
Payer: MEDICARE

## 2024-02-05 DIAGNOSIS — H90.3 SENSORINEURAL HEARING LOSS (SNHL) OF BOTH EARS: ICD-10-CM

## 2024-02-05 DIAGNOSIS — H83.09 LABYRINTHITIS, UNSPECIFIED LATERALITY: ICD-10-CM

## 2024-02-05 PROCEDURE — A9575 INJ GADOTERATE MEGLUMI 0.1ML: HCPCS | Performed by: OTOLARYNGOLOGY

## 2024-02-05 PROCEDURE — 70553 MRI BRAIN STEM W/O & W/DYE: CPT | Performed by: OTOLARYNGOLOGY

## 2024-02-05 RX ORDER — GADOTERATE MEGLUMINE 376.9 MG/ML
20 INJECTION INTRAVENOUS
Status: COMPLETED | OUTPATIENT
Start: 2024-02-05 | End: 2024-02-05

## 2024-02-05 RX ADMIN — GADOTERATE MEGLUMINE 20 ML: 376.9 INJECTION INTRAVENOUS at 17:39:00

## 2024-02-06 ENCOUNTER — TELEPHONE (OUTPATIENT)
Facility: LOCATION | Age: 74
End: 2024-02-06

## 2024-02-06 RX ORDER — FLUTICASONE PROPIONATE 50 MCG
2 SPRAY, SUSPENSION (ML) NASAL DAILY
Qty: 16 G | Refills: 3 | Status: SHIPPED | OUTPATIENT
Start: 2024-02-06

## 2024-02-06 NOTE — TELEPHONE ENCOUNTER
Patient was given results of a normal MRI scan however there was fluid within the left mastoid and sinus areas recommending use of Flonase nasal spray I will call in that this medication

## 2024-03-18 ENCOUNTER — OFFICE VISIT (OUTPATIENT)
Facility: LOCATION | Age: 74
End: 2024-03-18
Payer: MEDICARE

## 2024-03-18 DIAGNOSIS — H90.3 SENSORINEURAL HEARING LOSS (SNHL) OF BOTH EARS: Primary | ICD-10-CM

## 2024-03-18 DIAGNOSIS — H61.23 BILATERAL IMPACTED CERUMEN: Primary | ICD-10-CM

## 2024-03-18 DIAGNOSIS — H90.3 SENSORINEURAL HEARING LOSS (SNHL) OF BOTH EARS: ICD-10-CM

## 2024-03-18 DIAGNOSIS — H69.92 DYSFUNCTION OF LEFT EUSTACHIAN TUBE: ICD-10-CM

## 2024-03-18 DIAGNOSIS — R42 VERTIGO: ICD-10-CM

## 2024-03-18 PROCEDURE — 92504 EAR MICROSCOPY EXAMINATION: CPT | Performed by: OTOLARYNGOLOGY

## 2024-03-18 PROCEDURE — 99214 OFFICE O/P EST MOD 30 MIN: CPT | Performed by: OTOLARYNGOLOGY

## 2024-03-18 PROCEDURE — 92557 COMPREHENSIVE HEARING TEST: CPT | Performed by: AUDIOLOGIST

## 2024-03-18 PROCEDURE — 92567 TYMPANOMETRY: CPT | Performed by: AUDIOLOGIST

## 2024-03-18 NOTE — PROGRESS NOTES
Killian Villasenor was seen for an audiometric evaluation today. Referred back to physician.        Karolyn Lindquist MS, CCC-A

## 2024-03-18 NOTE — PROGRESS NOTES
Killian Villasenor was seen for an audiometric evaluation and tympanogram today. Referred back to physician.    Karolyn Lindquist MS, CCC-A

## 2024-03-18 NOTE — PROGRESS NOTES
Killian Villasenor is a 73 year old male.   Chief Complaint   Patient presents with    Ear Problem     HPI:   73-year-old white male who was seen a number of times he was having some recurrent vertigo I ordered an MRI scan it showed fluid within the mastoid on the left side.  He was instructed to use Flonase nasal spray he is following up he said no further vertigo does have wax no other complaints.  Current Outpatient Medications   Medication Sig Dispense Refill    fluticasone propionate 50 MCG/ACT Nasal Suspension 2 sprays by Nasal route daily. 16 g 3    diazePAM 5 MG Oral Tab Take 1 tablet (5 mg total) by mouth every 6 (six) hours as needed for Anxiety. 10 tablet 0    amLODIPine 2.5 MG Oral Tab Take 1 tablet (2.5 mg total) by mouth daily.      PEG 3350-KCl-Na Bicarb-NaCl 420 g Oral Recon Soln Take as directed by physician 4000 mL 0    ATORVASTATIN CALCIUM OR Take by mouth.      finasteride (PROSCAR) 5 MG Oral Tab Take 1 tablet (5 mg total) by mouth daily.      Losartan Potassium (COZAAR) 25 MG Oral Tab Take 1 tablet (25 mg total) by mouth daily.      aspirin 81 MG Oral Tab Take 1 tablet (81 mg total) by mouth daily.      tamsulosin HCl (FLOMAX) 0.4 MG Oral Cap Take 2 capsules by mouth daily. 180 capsule 3      Past Medical History:   Diagnosis Date    BPH (benign prostatic hyperplasia)     Essential hypertension     Frequent urination     Sleep apnea       Social History:  Social History     Socioeconomic History    Marital status:    Tobacco Use    Smoking status: Never    Smokeless tobacco: Never   Substance and Sexual Activity    Alcohol use: Yes     Alcohol/week: 5.0 standard drinks of alcohol     Types: 2 Glasses of wine, 1 Cans of beer, 2 Standard drinks or equivalent per week     Comment: Socially    Drug use: No      Past Surgical History:   Procedure Laterality Date    COLONOSCOPY      KNEE REPLACEMENT SURGERY           REVIEW OF SYSTEMS:   GENERAL HEALTH: feels well otherwise  GENERAL : denies fever,  chills, sweats, weight loss, weight gain  SKIN: denies any unusual skin lesions or rashes  RESPIRATORY: denies shortness of breath with exertion  NEURO: denies headaches    EXAM:   There were no vitals taken for this visit.    System Pertinent findings Details   Constitutional  Overall appearance - Normal.   Head/Face  Facial features -- Normal. Skull - Normal.   Eyes  Pupils equal ,round ,react to light and accomidate   Ears  External Ear Right: Normal, Left: Normal. Canal - Right: Normal, Left: Normal. TM - Right: Wax removed TM normal left: Wax removed TM normal may be retracted   Nose  External Nose, Normal, Septum -midline,Nasal Vault, clear. Turbinates - Right: Normal left: Normal   Mouth/Throat  Lips/teeth/gums - Normal. Tonsils -1+ oropharynx - Normal.   Neck Exam  Inspection - Normal. Palpation - Normal. Parotid gland - Normal. Thyroid gland -normal   Lymph Detail  Submental. Submandibular. Anterior cervical. Posterior cervical. Supraclavicular.   Patients exam showed wax impaction right ear, wax impaction left ear. Ear wax was removed under the operative microscope. No complications. Patient tolerated the procedure well. Ear exam findings listed in note after removal.    Audiogram shows downsloping high-frequency sensorineural hearing loss.  Left ear tympanogram shows type C pattern as well as right C pattern    ASSESSMENT AND PLAN:   1. Bilateral impacted cerumen  Wishes to follow-up in 3 to 4 months time    2. Sensorineural hearing loss (SNHL) of both ears  Stable    3. Dysfunction of left eustachian tube  Improving with Flonase continue Flonase follow-up 3 months pre-clinic audio and tympanogram.  If continued either type C pattern or goes back to middle ear effusion would consider ear tubes bilaterally.      The patient indicates understanding of these issues and agrees to the plan.      Geoff Mcgowan MD  3/18/2024  5:55 PM

## 2024-05-03 RX ORDER — FLUTICASONE PROPIONATE 50 MCG
2 SPRAY, SUSPENSION (ML) NASAL DAILY
Qty: 48 ML | Refills: 1 | Status: SHIPPED | OUTPATIENT
Start: 2024-05-03

## 2024-05-03 NOTE — TELEPHONE ENCOUNTER
Requested Prescriptions     Pending Prescriptions Disp Refills    FLUTICASONE PROPIONATE 50 MCG/ACT Nasal Suspension [Pharmacy Med Name: FLUTICASONE PROP 50 MCG SPRAY] 48 mL 1     Sig: SPRAY 2 SPRAYS BY NASAL ROUTE DAILY       FILLED- 2/6/24  LOV- 3/18/24    Future Appointments   Date Time Provider Department Center   6/18/2024 10:15 AM [facility] [id number]   6/18/2024 10:30 AM Geoff Mcgowan MD [facility] [id number]

## 2024-06-18 ENCOUNTER — OFFICE VISIT (OUTPATIENT)
Facility: LOCATION | Age: 74
End: 2024-06-18

## 2024-06-18 DIAGNOSIS — H93.293 ABNORMAL AUDITORY PERCEPTION OF BOTH EARS: Primary | ICD-10-CM

## 2024-06-18 DIAGNOSIS — H90.3 SENSORINEURAL HEARING LOSS (SNHL) OF BOTH EARS: Primary | ICD-10-CM

## 2024-06-18 DIAGNOSIS — H69.93 DYSFUNCTION OF BOTH EUSTACHIAN TUBES: ICD-10-CM

## 2024-06-18 PROCEDURE — 99214 OFFICE O/P EST MOD 30 MIN: CPT | Performed by: OTOLARYNGOLOGY

## 2024-06-18 PROCEDURE — 92567 TYMPANOMETRY: CPT | Performed by: AUDIOLOGIST

## 2024-06-18 NOTE — PROGRESS NOTES
Killian Villasenor was seen for a tympanogram today. Referred back to physician.    Basia Irene M.A., CCC-A

## 2024-06-18 NOTE — PROGRESS NOTES
Killian Villasenor is a 73 year old male. No chief complaint on file.    HPI:   23-year-old initially seen for vertigo which is since resolved itself had severe negative pressure tympanograms with no conductive component in hearing although he has downsloping high-frequency sensorineural loss which she states is stable.  We did an MRI scan was suggestive of fluid left greater than right mastoid again no conductive loss noted on audio.  He has been using Flonase with some effectiveness here for follow-up  Current Outpatient Medications   Medication Sig Dispense Refill    fluticasone propionate 50 MCG/ACT Nasal Suspension 2 sprays by Nasal route daily. 48 mL 1    diazePAM 5 MG Oral Tab Take 1 tablet (5 mg total) by mouth every 6 (six) hours as needed for Anxiety. 10 tablet 0    amLODIPine 2.5 MG Oral Tab Take 1 tablet (2.5 mg total) by mouth daily.      PEG 3350-KCl-Na Bicarb-NaCl 420 g Oral Recon Soln Take as directed by physician 4000 mL 0    ATORVASTATIN CALCIUM OR Take by mouth.      finasteride (PROSCAR) 5 MG Oral Tab Take 1 tablet (5 mg total) by mouth daily.      Losartan Potassium (COZAAR) 25 MG Oral Tab Take 1 tablet (25 mg total) by mouth daily.      aspirin 81 MG Oral Tab Take 1 tablet (81 mg total) by mouth daily.      tamsulosin HCl (FLOMAX) 0.4 MG Oral Cap Take 2 capsules by mouth daily. 180 capsule 3      Past Medical History:    BPH (benign prostatic hyperplasia)    Essential hypertension    Frequent urination    Sleep apnea      Social History:  Social History     Socioeconomic History    Marital status:    Tobacco Use    Smoking status: Never    Smokeless tobacco: Never   Substance and Sexual Activity    Alcohol use: Yes     Alcohol/week: 5.0 standard drinks of alcohol     Types: 2 Glasses of wine, 1 Cans of beer, 2 Standard drinks or equivalent per week     Comment: Socially    Drug use: No     Social Determinants of Health     Physical Activity: Sufficiently Active (12/1/2020)    Received from  [facility], [facility]    Exercise Vital Sign     Days of Exercise per Week: 3 days     Minutes of Exercise per Session: 60 min      Past Surgical History:   Procedure Laterality Date    Colonoscopy      Knee replacement surgery           REVIEW OF SYSTEMS:   GENERAL HEALTH: feels well otherwise  GENERAL : denies fever, chills, sweats, weight loss, weight gain  SKIN: denies any unusual skin lesions or rashes  RESPIRATORY: denies shortness of breath with exertion  NEURO: denies headaches    EXAM:   There were no vitals taken for this visit.    System Pertinent findings Details   Constitutional  Overall appearance - Normal.   Head/Face  Facial features -- Normal. Skull - Normal.   Eyes  Pupils equal ,round ,react to light and accomidate   Ears  External Ear Right: Normal, Left: Normal. Canal - Right: Normal, Left: Normal. TM - Right: Normal left: Normal   Nose  External Nose, Normal, Septum -  ,Nasal Vault, clear. Turbinates - Right: Normal left: Normal   Mouth/Throat  Lips/teeth/gums - Normal. Tonsils -1+ oropharynx - Normal.   Neck Exam  Inspection - Normal. Palpation - Normal. Parotid gland - Normal. Thyroid gland -normal   Lymph Detail  Submental. Submandibular. Anterior cervical. Posterior cervical. Supraclavicular.   Bilateral type C pattern tympanograms he did not repeat audiogram states that stable    ASSESSMENT AND PLAN:   1. Sensorineural hearing loss (SNHL) of both ears  Stable    2. Dysfunction of both eustachian tubes  Continue Flonase once daily follow-up as needed or in 1 year time pre-clinic audiogram and tympanogram      The patient indicates understanding of these issues and agrees to the plan.      Geoff Mcgowan MD  6/18/2024  10:40 AM

## 2024-09-26 ENCOUNTER — OFFICE VISIT (OUTPATIENT)
Dept: SPORTS MEDICINE | Age: 74
End: 2024-09-26

## 2024-09-26 DIAGNOSIS — G89.29 CHRONIC PAIN OF RIGHT KNEE: ICD-10-CM

## 2024-09-26 DIAGNOSIS — M25.561 CHRONIC PAIN OF RIGHT KNEE: ICD-10-CM

## 2024-09-26 DIAGNOSIS — M17.11 PRIMARY OSTEOARTHRITIS OF RIGHT KNEE: Primary | ICD-10-CM

## 2024-09-26 RX ORDER — BUPIVACAINE HYDROCHLORIDE 5 MG/ML
2 INJECTION, SOLUTION PERINEURAL
Status: COMPLETED | OUTPATIENT
Start: 2024-09-26 | End: 2024-09-26

## 2024-09-26 RX ORDER — TRIAMCINOLONE ACETONIDE 40 MG/ML
80 INJECTION, SUSPENSION INTRA-ARTICULAR; INTRAMUSCULAR
Status: COMPLETED | OUTPATIENT
Start: 2024-09-26 | End: 2024-09-26

## 2024-09-26 RX ORDER — LIDOCAINE HYDROCHLORIDE 10 MG/ML
2 INJECTION, SOLUTION INFILTRATION; PERINEURAL
Status: COMPLETED | OUTPATIENT
Start: 2024-09-26 | End: 2024-09-26

## 2024-09-26 RX ADMIN — BUPIVACAINE HYDROCHLORIDE 2 ML: 5 INJECTION, SOLUTION PERINEURAL at 09:42

## 2024-09-26 RX ADMIN — TRIAMCINOLONE ACETONIDE 80 MG: 40 INJECTION, SUSPENSION INTRA-ARTICULAR; INTRAMUSCULAR at 09:42

## 2024-09-26 RX ADMIN — LIDOCAINE HYDROCHLORIDE 2 ML: 10 INJECTION, SOLUTION INFILTRATION; PERINEURAL at 09:42

## 2025-05-14 ENCOUNTER — APPOINTMENT (OUTPATIENT)
Dept: GASTROENTEROLOGY | Age: 75
End: 2025-05-14
Attending: INTERNAL MEDICINE

## 2025-07-07 ENCOUNTER — OFFICE VISIT (OUTPATIENT)
Dept: ORTHOPEDICS | Age: 75
End: 2025-07-07

## 2025-07-07 ENCOUNTER — IMAGING SERVICES (OUTPATIENT)
Dept: GENERAL RADIOLOGY | Age: 75
End: 2025-07-07
Attending: ORTHOPAEDIC SURGERY

## 2025-07-07 VITALS — BODY MASS INDEX: 33.33 KG/M2 | HEIGHT: 69 IN | WEIGHT: 225 LBS

## 2025-07-07 DIAGNOSIS — M16.0 PRIMARY OSTEOARTHRITIS OF HIPS, BILATERAL: ICD-10-CM

## 2025-07-07 DIAGNOSIS — M25.561 RIGHT KNEE PAIN, UNSPECIFIED CHRONICITY: Primary | ICD-10-CM

## 2025-07-07 DIAGNOSIS — M17.11 UNILATERAL PRIMARY OSTEOARTHRITIS, RIGHT KNEE: ICD-10-CM

## 2025-07-07 DIAGNOSIS — M25.561 RIGHT KNEE PAIN, UNSPECIFIED CHRONICITY: ICD-10-CM

## 2025-07-07 PROCEDURE — 99205 OFFICE O/P NEW HI 60 MIN: CPT | Performed by: ORTHOPAEDIC SURGERY

## 2025-07-07 PROCEDURE — 73523 X-RAY EXAM HIPS BI 5/> VIEWS: CPT | Performed by: ORTHOPAEDIC SURGERY

## 2025-07-07 PROCEDURE — 73564 X-RAY EXAM KNEE 4 OR MORE: CPT | Performed by: ORTHOPAEDIC SURGERY

## (undated) NOTE — LETTER
Patient Name: Dinesh Griffin  YOB: 1950          MRN number:  [id number]  Date:  5/21/2021  Referring Physician:  Delwin Claude         SPINE EVALUATION:    Referring Physician: Dr. Jasmine Bailey  Diagnosis: Right hip pain (M25.551)  Bilateral sciatica ( c/o hip, low back pain described as shooting int the right hip. Pain is aggravated by sitting. extended periods of standing. Pain occ wakes him at night . Anish Williamson  The results of the objective tests and measures show loss of ROM of lumbar spine, poor mechanics, a abduction: R 4+/5; L 5/5  Hip Extension: R 4+/5; L 5/5   Hip ER: R 5/5; L 5/5  Hip IR: R 5/5; L 5/5  Knee Flexion: R 5/5; L 5/5   Knee extension (L3): R 5/5; L 5/5   DF (L4): R 5/5; L 5/5  Great Toe Ext (L5): R 5/5, L 5/5  PF (S1): R 5/5; L 5/5     Flexibi subjective pain 2/10 at worst.     Frequency / Duration: Patient will be seen for 2 x/week or a total of 10 visits over a 90 day period.  Treatment will include: Gait training, Manual Therapy, Neuromuscular Re-education, Self-Care Home Management, Therapeut

## (undated) NOTE — ED AVS SNAPSHOT
March Server   MRN: [id number]    Department:  [facility] Emergency Department   Date of Visit:  5/14/2019           Disclosure     Insurance plans vary and the physician(s) referred by the ER may not be covered by your plan.  Please contact your insu tell this physician (or your personal doctor if your instructions are to return to your personal doctor) about any new or lasting problems. The primary care or specialist physician will see patients referred from the [facility] Emergency Department.  Marleny Chandler